# Patient Record
Sex: MALE | Race: WHITE | Employment: FULL TIME | URBAN - METROPOLITAN AREA
[De-identification: names, ages, dates, MRNs, and addresses within clinical notes are randomized per-mention and may not be internally consistent; named-entity substitution may affect disease eponyms.]

---

## 2017-02-16 ENCOUNTER — OFFICE VISIT (OUTPATIENT)
Dept: SURGERY | Age: 37
End: 2017-02-16

## 2017-02-16 VITALS
DIASTOLIC BLOOD PRESSURE: 100 MMHG | OXYGEN SATURATION: 98 % | HEART RATE: 64 BPM | RESPIRATION RATE: 18 BRPM | HEIGHT: 75 IN | SYSTOLIC BLOOD PRESSURE: 150 MMHG | BODY MASS INDEX: 31.71 KG/M2 | TEMPERATURE: 97.7 F | WEIGHT: 255 LBS

## 2017-02-16 DIAGNOSIS — Z46.51 ADMISSION FOR ADJUSTMENT OF GASTRIC LAP BAND: ICD-10-CM

## 2017-02-16 DIAGNOSIS — R12 HEARTBURN: ICD-10-CM

## 2017-02-16 DIAGNOSIS — E66.9 OBESITY (BMI 30.0-34.9): ICD-10-CM

## 2017-02-16 DIAGNOSIS — E66.01 MORBID OBESITY DUE TO EXCESS CALORIES (HCC): Primary | ICD-10-CM

## 2017-02-16 NOTE — MR AVS SNAPSHOT
Visit Information Date & Time Provider Department Dept. Phone Encounter #  
 2/16/2017  3:00 PM Agustín Jones NP [place] [id number] [phone] [id number] Upcoming Health Maintenance Date Due Pneumococcal 19-64 Medium Risk (1 of 1 - PPSV23) 9/10/2000 DTaP/Tdap/Td series (1 - Tdap) 9/10/2002 INFLUENZA AGE 9 TO ADULT 8/1/2016 Allergies as of 2/16/2017  Review Complete On: 2/16/2017 By: Bakari Harris Severity Noted Reaction Type Reactions Codeine  12/24/2010    Palpitations Current Immunizations  Reviewed on 12/12/2013 No immunizations on file. Not reviewed this visit Vitals BP Pulse Temp Resp Height(growth percentile) Weight(growth percentile) (!) 150/100 (BP 1 Location: Left arm, BP Patient Position: Sitting) 64 97.7 °F (36.5 °C) (Oral) 18 6' 3\" (1.905 m) 255 lb (115.7 kg) SpO2 BMI Smoking Status 98% 31.87 kg/m2 Never Smoker BMI and BSA Data Body Mass Index Body Surface Area  
 31.87 kg/m 2 2.47 m 2 Preferred Pharmacy Pharmacy Name Phone [facility] [address] [phone] Your Updated Medication List  
  
   
This list is accurate as of: 2/16/17  3:48 PM.  Always use your most recent med list.  
  
  
  
  
 BACTRIM  mg per tablet Generic drug:  trimethoprim-sulfamethoxazole Take 1 Tab by mouth two (2) times a day. ergocalciferol 50,000 unit capsule Commonly known as:  ERGOCALCIFEROL Take 1 capsule by mouth every seven (7) days. Omeprazole delayed release 20 mg tablet Commonly known as:  PRILOSEC D/R Take 20 mg by mouth daily. traMADol 50 mg tablet Commonly known as:  ULTRAM  
Take 1 tablet by mouth every six (6) hours as needed for Pain. Patient Instructions Taniya Bailon will call you about the UGI and will try and get done on Monday at [facility] Surgery at [facility] 
[phone] Post Lap Band Adjustment Instructions Your band is now more restrictive. Some swelling can occur in the band following a fill. Therefore, you should eat : 
 
Full liquids for 12-24 hours Soft & mushy foods for 2 days Resume regular food on the 4th day. ** All meals should fit in a 4 ounce cup. (1/2 cup container) ** Choose foods that require chewing, ideally foods rich in fiber & protein. Avoid fatty & sugary foods. Avoid snack food items. Eating tips: 
 
Put down your fork between bites. Do not talk and eat at the same time. No drinking 30 minutes before or one hour after a meal. 
 
** Call for an evaluation if you develop new reflux (heartburn), a night time cough or inability to tolerate solids foods. ** Your next regular follow-up appointment is in: 4 to 6 weeks. Please call the office at [phone] to schedule this appointment. If you have any problems before your scheduled appointment, don't wait. Call the office when you are having the problem. They may need to see you before your scheduled appointment. Introducing [facility]! Dear Bree Pat: Thank you for requesting a Gogo account. Our records indicate that you already have an active Gogo account. You can access your account anytime at [email or web address] Did you know that you can access your hospital and ER discharge instructions at any time in Gogo? You can also review all of your test results from your hospital stay or ER visit. Additional Information If you have questions, please visit the Frequently Asked Questions section of the Gogo website at [email or web address] Remember, Gogo is NOT to be used for urgent needs. For medical emergencies, dial 911. Now available from your iPhone and Android! Please provide this summary of care documentation to your next provider. Your primary care clinician is listed as Patricia Carl. If you have any questions after today's visit, please call [phone].

## 2017-02-16 NOTE — PATIENT INSTRUCTIONS
Sivan Jackson will call you about the UGI and will try and get done on Monday at [address] [place] [address] Surgery at [facility]  [phone]      Post Lap Band Adjustment Instructions    Your band is now more restrictive. Some swelling can occur in the band following a fill. Therefore, you should eat :    Full liquids for 12-24 hours  Soft & mushy foods for 2 days  Resume regular food on the 4th day. ** All meals should fit in a 4 ounce cup. (1/2 cup container) **  Choose foods that require chewing, ideally foods rich in fiber & protein. Avoid fatty & sugary foods. Avoid snack food items. Eating tips:    Put down your fork between bites. Do not talk and eat at the same time. No drinking 30 minutes before or one hour after a meal.    ** Call for an evaluation if you develop new reflux (heartburn), a night time cough or inability to tolerate solids foods. **    Your next regular follow-up appointment is in: 4 to 6 weeks. Please call the office at [phone] to schedule this appointment. If you have any problems before your scheduled appointment, don't wait. Call the office when you are having the problem. They may need to see you before your scheduled appointment.

## 2017-02-16 NOTE — PROGRESS NOTES
1. Have you been to the ER, urgent care clinic since your last visit? Hospitalized since your last visit? No    2. Have you seen or consulted any other health care providers outside of the [facility] since your last visit? Include any pap smears or colon screening.  No

## 2017-02-20 NOTE — PROGRESS NOTES
Subjective:   Radha Wheeler is a 29 y.o. male with previous lap band surgery, who is here today needing lap band adjustment because of decreased satiety (pt. able to eat > 4 oz at one meal) and early sense of hunger (within 1-2 hours after eating). Dietary compliance is good. He has occasional episodes if \"tightness\" with the band and will drop back to full liquid for a few days. Occasional reflux and indigestion, but usually associated with eating too late. He has no sense of restriction and \"just wants a little back\". Objective:     Visit Vitals    BP (!) 150/100 (BP 1 Location: Left arm, BP Patient Position: Sitting)    Pulse 64    Temp 97.7 °F (36.5 °C) (Oral)    Resp 18    Ht 6' 3\" (1.905 m)    Wt 255 lb (115.7 kg)    SpO2 98%    BMI 31.87 kg/m2      Estimated body mass index is 31.87 kg/(m^2) as calculated from the following:    Height as of this encounter: 6' 3\" (1.905 m). Weight as of this encounter: 255 lb (115.7 kg). Wt Readings from Last 3 Encounters:   02/16/17 255 lb (115.7 kg)   12/02/15 272 lb (123.4 kg)   11/19/15 272 lb (123.4 kg)     His change in weight since last visit: lost,  17 lbs. Visit Vitals    BP (!) 150/100 (BP 1 Location: Left arm, BP Patient Position: Sitting)    Pulse 64    Temp 97.7 °F (36.5 °C) (Oral)    Resp 18    Ht 6' 3\" (1.905 m)    Wt 255 lb (115.7 kg)    SpO2 98%    BMI 31.87 kg/m2     A + O x 3  Chest  CTA  COR  RRR  ABD Soft, port palpable and non tender, ND, +BS,  EXT No edema; ambulating independently        Assessment/Plan: Morbid Obesity, status post lap-band surgery, needing fill adjustment  Lap Band FiIl Procedure    Verbal consent was obtained. The patient was placed in the standing position. The port area was prepped using sterile technique and a Montelongo needle was inserted. The port was accessed with ease. Previous Fill Volume:  7.5 cc.    Added:  0.4 cc   Plan on UGI to evaluate band position / pouch size with phone follow up Reviewed Red Zone symptoms   Patient tolerated the procedure well. Follow up in 4 to 6 weeks as needed.

## 2017-10-13 ENCOUNTER — TELEPHONE (OUTPATIENT)
Dept: SURGERY | Age: 37
End: 2017-10-13

## 2017-10-20 ENCOUNTER — OFFICE VISIT (OUTPATIENT)
Dept: SURGERY | Age: 37
End: 2017-10-20

## 2017-10-20 VITALS
RESPIRATION RATE: 18 BRPM | OXYGEN SATURATION: 99 % | HEIGHT: 75 IN | TEMPERATURE: 98.2 F | BODY MASS INDEX: 34.69 KG/M2 | HEART RATE: 73 BPM | WEIGHT: 279 LBS | SYSTOLIC BLOOD PRESSURE: 130 MMHG | DIASTOLIC BLOOD PRESSURE: 80 MMHG

## 2017-10-20 DIAGNOSIS — E55.9 VITAMIN D DEFICIENCY: ICD-10-CM

## 2017-10-20 DIAGNOSIS — R63.5 WEIGHT GAIN: ICD-10-CM

## 2017-10-20 DIAGNOSIS — E66.01 SEVERE OBESITY (BMI 35.0-39.9): Primary | ICD-10-CM

## 2017-10-20 DIAGNOSIS — Z46.51 ADMISSION FOR ADJUSTMENT OF GASTRIC LAP BAND: ICD-10-CM

## 2017-10-20 RX ORDER — ERGOCALCIFEROL 1.25 MG/1
50000 CAPSULE ORAL
Qty: 5 CAP | Refills: 5 | Status: SHIPPED | OUTPATIENT
Start: 2017-10-20 | End: 2018-10-23

## 2017-10-20 NOTE — PROGRESS NOTES
1. Have you been to the ER, urgent care clinic since your last visit? Hospitalized since your last visit?  no    2. Have you seen or consulted any other health care providers outside of the [address] [place] [address] since your last visit? Include any pap smears or colon screening.    no

## 2017-10-20 NOTE — PATIENT INSTRUCTIONS
Isabel Yañez will contact you to conform date/time for band evaluation in xray     Tentatively plan on Wednesday 12/6/17 early afternoon     If you continue to have reflux, night-time cough, heartburn or regurgitation, please call me and we will get a look at your band sooner.     Schedule an appointment with the dietician, please call or email   Scooby Lanier RD at:    [phone]  [email or web address]

## 2017-10-20 NOTE — MR AVS SNAPSHOT
Visit Information Date & Time Provider Department Dept. Phone Encounter #  
 10/20/2017 10:00 AM Guero Peng NP [facility] GENERAL SURGERY [phone] [id number] Upcoming Health Maintenance Date Due Pneumococcal 19-64 Medium Risk (1 of 1 - PPSV23) 9/10/2000 DTaP/Tdap/Td series (1 - Tdap) 9/10/2002 INFLUENZA AGE 9 TO ADULT 8/1/2017 Allergies as of 10/20/2017  Review Complete On: 10/20/2017 By: Jessie Lopez LPN Severity Noted Reaction Type Reactions Codeine  12/24/2010    Palpitations Current Immunizations  Reviewed on 12/12/2013 No immunizations on file. Not reviewed this visit You Were Diagnosed With   
  
 Codes Comments Vitamin D deficiency    -  Primary ICD-10-CM: E55.9 ICD-9-CM: 268.9 Vitals BP Pulse Temp Resp Height(growth percentile) Weight(growth percentile) 130/80 73 98.2 °F (36.8 °C) 18 6' 3\" (1.905 m) 279 lb (126.6 kg) SpO2 BMI Smoking Status 99% 34.87 kg/m2 Never Smoker Vitals History BMI and BSA Data Body Mass Index Body Surface Area 34.87 kg/m 2 2.59 m 2 Preferred Pharmacy Pharmacy Name Phone Emily [facility] [address] [place] [address] [phone] Your Updated Medication List  
  
   
This list is accurate as of: 10/20/17 11:11 AM.  Always use your most recent med list.  
  
  
  
  
 ergocalciferol 50,000 unit capsule Commonly known as:  ERGOCALCIFEROL Take 1 Cap by mouth every seven (7) days. Prescriptions Sent to Pharmacy Refills  
 ergocalciferol (ERGOCALCIFEROL) 50,000 unit capsule 5 Sig: Take 1 Cap by mouth every seven (7) days. Class: Normal  
 Pharmacy: Emily [facility]ultz[facility], [address] [facility] #: [phone] Route: Oral  
  
We Performed the Following VITAMIN D, 25 HYDROXY [id number] CPT(R)] Patient Instructions Paul Melgar will contact you to conform date/time for band evaluation in xray Tentatively plan on Wednesday 12/6/17 early afternoon If you continue to have reflux, night-time cough, heartburn or regurgitation, please call me and we will get a look at your band sooner. Schedule an appointment with the dietician, please call or email Barry Rodriguez RD at: 
 
[phone] [email or web address] Introducing [facility]! Dear Libia Quick: Thank you for requesting a Gimado account. Our records indicate that you already have an active Gimado account. You can access your account anytime at [email or web address] Did you know that you can access your hospital and ER discharge instructions at any time in Gimado? You can also review all of your test results from your hospital stay or ER visit. Additional Information If you have questions, please visit the Frequently Asked Questions section of the Gimado website at [email or web address] Remember, Gimado is NOT to be used for urgent needs. For medical emergencies, dial 911. Now available from your iPhone and Android! Please provide this summary of care documentation to your next provider. Your primary care clinician is listed as Danie Mclaughlin. If you have any questions after today's visit, please call [phone].

## 2017-10-31 NOTE — PROGRESS NOTES
Subjective:   Irwin Weston is a 40 y.o. male with previous lap band surgery, who is here today needing lap band adjustment because of decreased satiety (pt. able to eat > 4 oz at one meal), weight gain, productive burping, reflux and difficulty swallowing. Dietary compliance is poor. He has been struggling for some time and had no insurance coverage. He has switch jobs and now has benefits. He reports frequent regurgitation and reflux. PRN night cough if eats too late. No port pain. Objective:     Visit Vitals    /80    Pulse 73    Temp 98.2 °F (36.8 °C)    Resp 18    Ht 6' 3\" (1.905 m)    Wt 279 lb (126.6 kg)    SpO2 99%    BMI 34.87 kg/m2      Estimated body mass index is 34.87 kg/(m^2) as calculated from the following:    Height as of this encounter: 6' 3\" (1.905 m). Weight as of this encounter: 279 lb (126.6 kg). Wt Readings from Last 3 Encounters:   10/20/17 279 lb (126.6 kg)   02/16/17 255 lb (115.7 kg)   12/02/15 272 lb (123.4 kg)     His change in weight since last visit: gained, 20+ lbs. A + O x 3  Chest  CTA  COR  RRR  ABD Soft, obese, port palpable and non tender/ND, +BS  EXT No edema; ambulating independently    Visit Vitals    /80    Pulse 73    Temp 98.2 °F (36.8 °C)    Resp 18    Ht 6' 3\" (1.905 m)    Wt 279 lb (126.6 kg)    SpO2 99%    BMI 34.87 kg/m2         Assessment/Plan: Morbid Obesity, status post lap-band surgery, needing fill adjustment  Lap Band FiIl Procedure    Verbal consent was obtained. The patient was placed in the standing position. The port area was prepped using sterile technique and a Montelongo needle was inserted. The port was accessed with ease. Previous Fill Volume:  7.9 cc. Removed:  1 cc   Patient tolerated the procedure well.   Will plan on fluoroscopic evaluation in next 2 months   Reviewed red zone symptoms   Referred to RD for back on track   Plan on office follow up after fluoro evaluation   Irwin Weston verbalized understanding and questions were answered to the best of my knowledge and ability. Diet  educational materials were provided.

## 2018-01-03 ENCOUNTER — APPOINTMENT (OUTPATIENT)
Dept: GENERAL RADIOLOGY | Age: 38
End: 2018-01-03
Attending: SURGERY
Payer: SELF-PAY

## 2018-01-03 ENCOUNTER — HOSPITAL ENCOUNTER (OUTPATIENT)
Age: 38
Setting detail: OUTPATIENT SURGERY
Discharge: HOME | End: 2018-01-03
Attending: SURGERY | Admitting: SURGERY
Payer: SELF-PAY

## 2018-01-03 VITALS
DIASTOLIC BLOOD PRESSURE: 91 MMHG | BODY MASS INDEX: 36.45 KG/M2 | HEART RATE: 70 BPM | WEIGHT: 293.12 LBS | HEIGHT: 75 IN | SYSTOLIC BLOOD PRESSURE: 144 MMHG

## 2018-01-03 PROCEDURE — 77002 NEEDLE LOCALIZATION BY XRAY: CPT

## 2018-01-03 PROCEDURE — 43999 UNLISTED PROCEDURE STOMACH: CPT | Performed by: SURGERY

## 2018-01-03 PROCEDURE — 77030003562 HC NDL HUBR J&J -A: Performed by: SURGERY

## 2018-01-03 NOTE — PROGRESS NOTES
PROCEDURAL PROGRESS NOTES FOR LAP BAND FILLS      Patient ID:   Name: Prashant Severino   Medical Record Number: [id number]   YOB: 1980         Date of Procedure: 1/3/2018    Pre-Procedure Diagnosis: MORBID OBESITY    Post-Procedure Diagnosis:  MORBID OBESITY              Height: 6' 3\" (190.5 cm)      Weight: 293 lb 1.9 oz (133 kg)      Type of Band: AP std      Reason for Band Visit: Decreased satiety (Pt. able to eat more than 4 oz at a meal)    Surgeon(s) and Role:     * Hargis Bumpers, MD - supervising Physician  Amy Sterling NP- performing fill. Procedure: Procedure(s):  GASTRIC BAND FILL    Findings:   Band in what position:  Good             Saline (mL) added to Band: 0.7      Total saline (mL) placed in Band: 7.5. Estimated Blood Loss: 0 ml    Complications: None       Lap band AP standard 12/2013  Lost to follow due to insurance issues. Wants to get back on track. Last seen in the office 10/31/2017 and fluid was removed due to reflux. Informed consent was obtained. Patient was placed in the standing position. The abdomen was prepped using sterile technique. The port was then accessed with ease. 6.9 ml was documented in  the band. 0.7 ml was added to band. Total of 7.6 in band. Patient tolerated the procedure well and without difficulty. Patient then tolerated at least 6 ounces of water without difficulty. Patient was instructed in full liquid diet for next 48 hours and then advance as tolerated. Patient to follow up in 4-6 weeks. Patient was advised to notify office if experience dysphagia, nausea/vomiting, reflux.     Signed By: Chantal Colvin NP     January 3, 2018 1:44 PM

## 2018-01-03 NOTE — DISCHARGE INSTRUCTIONS
General Surgery at [facility]    Post Lap Band Fill Instructions    Your band is now very tight. Some swelling can occur at the band following a fill. Therefore, you should eat:    Full liquids for 2 days (today & tomorrow)  Soft & mushy foods for 2 days (Friday & Saturday)  Resume regular food on the 5th day following your fill. - Sunday  **All meals should fit in a 4 ounce cup.  (1/2 cup container)**    Eating tips:  Put down your fork between bites. Do not talk and eat at the same time. If you must use your fork, use it to push the food around on your plate while chewing what is in your mouth. No drinking 30 minutes prior or one hour following a meal.    Weigh yourself every Monday morning. Call for a fill if you are losing less than 1.5 pounds a week. Call for a fill if you are having increasing hunger between meals. Call for an evaluation if you develop new reflux or inability to tolerate solid foods. Your next regular follow-up appointment is in:  4 to 6 weeks  Please call the office at [phone] to schedule this appointment. If you have any problems before your scheduled appointment, don't wait. Call the office when you are having the problem. They may need to see your before your scheduled appointment.

## 2018-10-15 ENCOUNTER — TELEPHONE (OUTPATIENT)
Dept: SURGERY | Age: 38
End: 2018-10-15

## 2018-10-22 ENCOUNTER — HOSPITAL ENCOUNTER (EMERGENCY)
Age: 38
Discharge: HOME | End: 2018-10-23
Attending: EMERGENCY MEDICINE
Payer: COMMERCIAL

## 2018-10-22 DIAGNOSIS — N20.0 KIDNEY STONE: Primary | ICD-10-CM

## 2018-10-22 PROCEDURE — 99284 EMERGENCY DEPT VISIT MOD MDM: CPT

## 2018-10-23 ENCOUNTER — APPOINTMENT (OUTPATIENT)
Dept: CT IMAGING | Age: 38
End: 2018-10-23
Attending: PHYSICIAN ASSISTANT
Payer: COMMERCIAL

## 2018-10-23 VITALS
OXYGEN SATURATION: 97 % | HEART RATE: 73 BPM | DIASTOLIC BLOOD PRESSURE: 95 MMHG | TEMPERATURE: 98.3 F | WEIGHT: 290 LBS | HEIGHT: 76 IN | BODY MASS INDEX: 35.31 KG/M2 | RESPIRATION RATE: 16 BRPM | SYSTOLIC BLOOD PRESSURE: 140 MMHG

## 2018-10-23 LAB
ALBUMIN SERPL-MCNC: 3.8 G/DL (ref 3.5–5)
ALBUMIN/GLOB SERPL: 1 {RATIO} (ref 1.1–2.2)
ALP SERPL-CCNC: 77 U/L (ref 45–117)
ALT SERPL-CCNC: 25 U/L (ref 12–78)
ANION GAP SERPL CALC-SCNC: 9 MMOL/L (ref 5–15)
APPEARANCE UR: CLEAR
AST SERPL-CCNC: 25 U/L (ref 15–37)
BACTERIA URNS QL MICRO: NEGATIVE /HPF
BASOPHILS # BLD: 0.1 K/UL (ref 0–0.1)
BASOPHILS NFR BLD: 1 % (ref 0–1)
BILIRUB SERPL-MCNC: 0.8 MG/DL (ref 0.2–1)
BILIRUB UR QL: NEGATIVE
BUN SERPL-MCNC: 15 MG/DL (ref 6–20)
BUN/CREAT SERPL: 12 (ref 12–20)
CALCIUM SERPL-MCNC: 8.7 MG/DL (ref 8.5–10.1)
CHLORIDE SERPL-SCNC: 106 MMOL/L (ref 97–108)
CO2 SERPL-SCNC: 28 MMOL/L (ref 21–32)
COLOR UR: ABNORMAL
COMMENT, HOLDF: NORMAL
CREAT SERPL-MCNC: 1.24 MG/DL (ref 0.7–1.3)
DIFFERENTIAL METHOD BLD: NORMAL
EOSINOPHIL # BLD: 0.4 K/UL (ref 0–0.4)
EOSINOPHIL NFR BLD: 4 % (ref 0–7)
EPITH CASTS URNS QL MICRO: ABNORMAL /LPF
ERYTHROCYTE [DISTWIDTH] IN BLOOD BY AUTOMATED COUNT: 13.2 % (ref 11.5–14.5)
GLOBULIN SER CALC-MCNC: 3.8 G/DL (ref 2–4)
GLUCOSE SERPL-MCNC: 100 MG/DL (ref 65–100)
GLUCOSE UR STRIP.AUTO-MCNC: NEGATIVE MG/DL
HCT VFR BLD AUTO: 43.5 % (ref 36.6–50.3)
HGB BLD-MCNC: 14.7 G/DL (ref 12.1–17)
HGB UR QL STRIP: ABNORMAL
IMM GRANULOCYTES # BLD: 0 K/UL (ref 0–0.04)
IMM GRANULOCYTES NFR BLD AUTO: 0 % (ref 0–0.5)
KETONES UR QL STRIP.AUTO: 15 MG/DL
LEUKOCYTE ESTERASE UR QL STRIP.AUTO: NEGATIVE
LIPASE SERPL-CCNC: 132 U/L (ref 73–393)
LYMPHOCYTES # BLD: 2.1 K/UL (ref 0.8–3.5)
LYMPHOCYTES NFR BLD: 20 % (ref 12–49)
MCH RBC QN AUTO: 30.1 PG (ref 26–34)
MCHC RBC AUTO-ENTMCNC: 33.8 G/DL (ref 30–36.5)
MCV RBC AUTO: 89.1 FL (ref 80–99)
MONOCYTES # BLD: 0.6 K/UL (ref 0–1)
MONOCYTES NFR BLD: 6 % (ref 5–13)
NEUTS SEG # BLD: 7.3 K/UL (ref 1.8–8)
NEUTS SEG NFR BLD: 69 % (ref 32–75)
NITRITE UR QL STRIP.AUTO: NEGATIVE
NRBC # BLD: 0 K/UL (ref 0–0.01)
NRBC BLD-RTO: 0 PER 100 WBC
PH UR STRIP: 5.5 [PH] (ref 5–8)
PLATELET # BLD AUTO: 267 K/UL (ref 150–400)
PMV BLD AUTO: 9.9 FL (ref 8.9–12.9)
POTASSIUM SERPL-SCNC: 3.8 MMOL/L (ref 3.5–5.1)
PROT SERPL-MCNC: 7.6 G/DL (ref 6.4–8.2)
PROT UR STRIP-MCNC: NEGATIVE MG/DL
RBC # BLD AUTO: 4.88 M/UL (ref 4.1–5.7)
RBC #/AREA URNS HPF: ABNORMAL /HPF (ref 0–5)
SAMPLES BEING HELD,HOLD: NORMAL
SODIUM SERPL-SCNC: 143 MMOL/L (ref 136–145)
SP GR UR REFRACTOMETRY: >1.03 (ref 1–1.03)
UR CULT HOLD, URHOLD: NORMAL
UROBILINOGEN UR QL STRIP.AUTO: 0.2 EU/DL (ref 0.2–1)
WBC # BLD AUTO: 10.5 K/UL (ref 4.1–11.1)
WBC URNS QL MICRO: ABNORMAL /HPF (ref 0–4)

## 2018-10-23 PROCEDURE — 83690 ASSAY OF LIPASE: CPT | Performed by: PHYSICIAN ASSISTANT

## 2018-10-23 PROCEDURE — 85025 COMPLETE CBC W/AUTO DIFF WBC: CPT | Performed by: PHYSICIAN ASSISTANT

## 2018-10-23 PROCEDURE — 96361 HYDRATE IV INFUSION ADD-ON: CPT

## 2018-10-23 PROCEDURE — 74177 CT ABD & PELVIS W/CONTRAST: CPT

## 2018-10-23 PROCEDURE — 96374 THER/PROPH/DIAG INJ IV PUSH: CPT

## 2018-10-23 PROCEDURE — 36415 COLL VENOUS BLD VENIPUNCTURE: CPT | Performed by: PHYSICIAN ASSISTANT

## 2018-10-23 PROCEDURE — 81001 URINALYSIS AUTO W/SCOPE: CPT | Performed by: PHYSICIAN ASSISTANT

## 2018-10-23 PROCEDURE — 74011250636 HC RX REV CODE- 250/636: Performed by: PHYSICIAN ASSISTANT

## 2018-10-23 PROCEDURE — 80053 COMPREHEN METABOLIC PANEL: CPT | Performed by: PHYSICIAN ASSISTANT

## 2018-10-23 PROCEDURE — 96375 TX/PRO/DX INJ NEW DRUG ADDON: CPT

## 2018-10-23 PROCEDURE — 74011636320 HC RX REV CODE- 636/320: Performed by: RADIOLOGY

## 2018-10-23 RX ORDER — KETOROLAC TROMETHAMINE 30 MG/ML
30 INJECTION, SOLUTION INTRAMUSCULAR; INTRAVENOUS
Status: COMPLETED | OUTPATIENT
Start: 2018-10-23 | End: 2018-10-23

## 2018-10-23 RX ORDER — ONDANSETRON 2 MG/ML
4 INJECTION INTRAMUSCULAR; INTRAVENOUS
Status: DISCONTINUED | OUTPATIENT
Start: 2018-10-23 | End: 2018-10-23 | Stop reason: HOSPADM

## 2018-10-23 RX ORDER — ONDANSETRON 4 MG/1
4 TABLET, ORALLY DISINTEGRATING ORAL
Qty: 12 TAB | Refills: 0 | Status: SHIPPED | OUTPATIENT
Start: 2018-10-23 | End: 2019-06-11

## 2018-10-23 RX ORDER — TAMSULOSIN HYDROCHLORIDE 0.4 MG/1
0.4 CAPSULE ORAL DAILY
Qty: 15 CAP | Refills: 0 | Status: SHIPPED | OUTPATIENT
Start: 2018-10-23 | End: 2018-11-07

## 2018-10-23 RX ORDER — OXYCODONE AND ACETAMINOPHEN 5; 325 MG/1; MG/1
1 TABLET ORAL
Qty: 10 TAB | Refills: 0 | Status: SHIPPED | OUTPATIENT
Start: 2018-10-23 | End: 2019-06-11

## 2018-10-23 RX ORDER — MORPHINE SULFATE 4 MG/ML
4 INJECTION INTRAVENOUS
Status: COMPLETED | OUTPATIENT
Start: 2018-10-23 | End: 2018-10-23

## 2018-10-23 RX ADMIN — MORPHINE SULFATE 4 MG: 4 INJECTION, SOLUTION INTRAMUSCULAR; INTRAVENOUS at 01:51

## 2018-10-23 RX ADMIN — IOPAMIDOL 100 ML: 755 INJECTION, SOLUTION INTRAVENOUS at 01:45

## 2018-10-23 RX ADMIN — SODIUM CHLORIDE 1000 ML: 900 INJECTION, SOLUTION INTRAVENOUS at 00:35

## 2018-10-23 RX ADMIN — KETOROLAC TROMETHAMINE 30 MG: 30 INJECTION, SOLUTION INTRAMUSCULAR at 00:35

## 2018-10-23 NOTE — ED PROVIDER NOTES
Virginia Chavez is a 41 y.o. male  who presents by private vehicle to ER with c/o Patient presents with: 
Abdominal Pain Patient reports with abdominal pain x 3 hours. Patient reports last bowel movement with 2 days ago. Patient reports history of kidney stones and lap band surgery. Patient reports nausea, denies vomiting or fever. Patient denies pain with urination. Patient reports he was on the toilet and \"blacked out from the pain\" 1 hour prior to arrival.  
 
He specifically denies any fevers, chills, vomiting, chest pain, shortness of breath, headache, rash, diarrhea,  urinary/bowel changes, sweating or weight loss. PCP: Clarissa Mari MD  
PMHx significant for: Past Medical History: 
No date: Allergic rhinitis No date: Hypertension No date: Kidney stone No date: Migraine No date: Morbid obesity ([facility] [address] 2008: Personal history of kidney stones No date: Sleep apnea Comment:  BiPap PSHx significant for: Past Surgical History: 
12/12/13: ABDOMEN SURGERY PROC UNLISTED Comment:  Laparoscopic adjustable gastric banding by Dr Jonelle Schwartz Social Hx: Tobacco use: Social History Tobacco Use Smoking status: Never Smoker Smokeless tobacco: Never Used 
; EtOH use: The patient states he drinks 0 per week.; Illicit Drug use: Allergies: 
 -- Codeine -- Palpitations There are no other complaints, changes or physical findings at this time. The history is provided by the patient. Abdominal Pain This is a new problem. The problem occurs constantly. The problem has not changed since onset. The pain is located in the generalized abdominal region. The pain is at a severity of 9/10. The pain is severe. Associated symptoms include nausea. Nothing worsens the pain. The pain is relieved by nothing. His past medical history is significant for kidney stones.  His past medical history does not include Crohn's disease, UTI, pancreatitis or diverticulitis. The patient's surgical history includes gastric bypass. Past Medical History:  
Diagnosis Date  Allergic rhinitis  Hypertension  Kidney stone  Migraine  Morbid obesity ([address]  Personal history of kidney stones 2008  Sleep apnea BiPap Past Surgical History:  
Procedure Laterality Date  ABDOMEN SURGERY PROC UNLISTED  12/12/13 Laparoscopic adjustable gastric banding by Dr Ruddy Guerrier Family History:  
Problem Relation Age of Onset  Elevated Lipids Mother  Hypertension Mother  Diabetes Mother  Cancer Mother  Heart Disease Mother  Heart Disease Father  Diabetes Father  Heart Attack Father  Hypertension Father  Cancer Father  Diabetes Sister  Hypertension Sister  Elevated Lipids Sister Social History Socioeconomic History  Marital status: SINGLE Spouse name: Not on file  Number of children: Not on file  Years of education: Not on file  Highest education level: Not on file Social Needs  Financial resource strain: Not on file  Food insecurity - worry: Not on file  Food insecurity - inability: Not on file  Transportation needs - medical: Not on file  Transportation needs - non-medical: Not on file Occupational History  Not on file Tobacco Use  Smoking status: Never Smoker  Smokeless tobacco: Never Used Substance and Sexual Activity  Alcohol use: Yes Alcohol/week: 1.0 oz Types: 2 Shots of liquor per week Comment: 2 SHOTS A WEEK MAX.  Drug use: No  
 Sexual activity: Yes Other Topics Concern  Not on file Social History Narrative  Not on file ALLERGIES: Codeine Review of Systems Constitutional: Negative. HENT: Negative. Eyes: Negative. Respiratory: Negative. Cardiovascular: Negative. Gastrointestinal: Positive for abdominal pain and nausea. Endocrine: Negative. Genitourinary: Negative. Musculoskeletal: Negative. Skin: Negative. Allergic/Immunologic: Negative. Neurological: Negative. Hematological: Negative. All other systems reviewed and are negative. Vitals:  
 10/23/18 0015 10/23/18 0030 10/23/18 0045 10/23/18 0100 BP: (!) 158/101 (!) 178/120 (!) 158/116 (!) 162/99 Pulse:      
Resp:      
Temp:      
SpO2: 98% 100% 96% 100% Weight:      
Height:      
      
 
Physical Exam  
Constitutional: He is oriented to person, place, and time. He appears well-developed and well-nourished. HENT:  
Head: Normocephalic and atraumatic. Right Ear: External ear normal.  
Left Ear: External ear normal.  
Mouth/Throat: Oropharynx is clear and moist. No oropharyngeal exudate. Eyes: Conjunctivae and EOM are normal. Pupils are equal, round, and reactive to light. Right eye exhibits no discharge. Left eye exhibits no discharge. No scleral icterus. Neck: Normal range of motion. Neck supple. No tracheal deviation present. No thyromegaly present. Cardiovascular: Normal rate, regular rhythm, normal heart sounds and intact distal pulses. No murmur heard. Pulmonary/Chest: Effort normal and breath sounds normal. No respiratory distress. He has no wheezes. He has no rales. Abdominal: Soft. Bowel sounds are normal. He exhibits no distension. There is generalized tenderness. There is no rebound and no guarding. Musculoskeletal: Normal range of motion. He exhibits no edema or tenderness. Lymphadenopathy:  
  He has no cervical adenopathy. Neurological: He is alert and oriented to person, place, and time. No cranial nerve deficit. Coordination normal.  
Skin: Skin is warm. No rash noted. No erythema. Psychiatric: He has a normal mood and affect. His behavior is normal. Judgment and thought content normal.  
Nursing note and vitals reviewed. MDM Number of Diagnoses or Management Options Kidney stone: Diagnosis management comments: Assesment/Plan- 41 y.o. Patient presents with: 
Abdominal Pain 
differential includes: colitis, kidney stone, obstruction, gastroenteritis . Labs and imaging reviewed with kidney stone with hydronephrosis. Will discharge home. Recommend Urology follow up. Patient educated on reasons to return to the ED. Amount and/or Complexity of Data Reviewed Clinical lab tests: ordered and reviewed Tests in the radiology section of CPT®: ordered Tests in the medicine section of CPT®: ordered and reviewed Procedures

## 2018-10-23 NOTE — DISCHARGE INSTRUCTIONS
Kidney Stone: Care Instructions  Your Care Instructions    Kidney stones are formed when salts, minerals, and other substances normally found in the urine clump together. They can be as small as grains of sand or, rarely, as large as golf balls. While the stone is traveling through the ureter, which is the tube that carries urine from the kidney to the bladder, you will probably feel pain. The pain may be mild or very severe. You may also have some blood in your urine. As soon as the stone reaches the bladder, any intense pain should go away. If a stone is too large to pass on its own, you may need a medical procedure to help you pass the stone. The doctor has checked you carefully, but problems can develop later. If you notice any problems or new symptoms, get medical treatment right away. Follow-up care is a key part of your treatment and safety. Be sure to make and go to all appointments, and call your doctor if you are having problems. It's also a good idea to know your test results and keep a list of the medicines you take. How can you care for yourself at home? · Drink plenty of fluids, enough so that your urine is light yellow or clear like water. If you have kidney, heart, or liver disease and have to limit fluids, talk with your doctor before you increase the amount of fluids you drink. · Take pain medicines exactly as directed. Call your doctor if you think you are having a problem with your medicine. ? If the doctor gave you a prescription medicine for pain, take it as prescribed. ? If you are not taking a prescription pain medicine, ask your doctor if you can take an over-the-counter medicine. Read and follow all instructions on the label. · Your doctor may ask you to strain your urine so that you can collect your kidney stone when it passes. You can use a kitchen strainer or a tea strainer to catch the stone. Store it in a plastic bag until you see your doctor again.   Preventing future kidney stones  Some changes in your diet may help prevent kidney stones. Depending on the cause of your stones, your doctor may recommend that you:  · Drink plenty of fluids, enough so that your urine is light yellow or clear like water. If you have kidney, heart, or liver disease and have to limit fluids, talk with your doctor before you increase the amount of fluids you drink. · Limit coffee, tea, and alcohol. Also avoid grapefruit juice. · Do not take more than the recommended daily dose of vitamins C and D.  · Avoid antacids such as Gaviscon, Maalox, Mylanta, or Tums. · Limit the amount of salt (sodium) in your diet. · Eat a balanced diet that is not too high in protein. · Limit foods that are high in a substance called oxalate, which can cause kidney stones. These foods include dark green vegetables, rhubarb, chocolate, wheat bran, nuts, cranberries, and beans. When should you call for help? Call your doctor now or seek immediate medical care if:    · You cannot keep down fluids.     · Your pain gets worse.     · You have a fever or chills.     · You have new or worse pain in your back just below your rib cage (the flank area).     · You have new or more blood in your urine.    Watch closely for changes in your health, and be sure to contact your doctor if:    · You do not get better as expected. Where can you learn more? Go to http://iona-terri.info/. Enter N249 in the search box to learn more about \"Kidney Stone: Care Instructions. \"  Current as of: March 15, 2018  Content Version: 11.8  © 4187-6334 [employer]. Care instructions adapted under license by [employer] (which disclaims liability or warranty for this information). If you have questions about a medical condition or this instruction, always ask your healthcare professional. [address] any warranty or liability for your use of this information.            We hope that we have addressed all of your medical concerns. The examination and treatment you received in the Emergency Department were for an emergent problem and were not intended as complete care. It is important that you follow up with your healthcare provider(s) for ongoing care. If your symptoms worsen or do not improve as expected, and you are unable to reach your usual health care provider(s), you should return to the Emergency Department. Today's healthcare is undergoing tremendous change, and patient satisfaction surveys are one of the many tools to assess the quality of medical care. You may receive a survey from the [employer] regarding your experience in the Emergency Department. I hope that your experience has been completely positive, particularly the medical care that I provided. As such, please participate in the survey; anything less than excellent does not meet my expectations or intentions. [facility] and 8 [facility] participate in nationally recognized quality of care measures. If your blood pressure is greater than 120/80, as reported below, we urge that you seek medical care to address the potential of high blood pressure, commonly known as hypertension. Hypertension can be hereditary or can be caused by certain medical conditions, pain, stress, or \"white coat syndrome. \"       Please make an appointment with your health care provider(s) for follow up of your Emergency Department visit. VITALS:   Patient Vitals for the past 8 hrs:   Temp Pulse Resp BP SpO2   10/23/18 0100 -- -- -- (!) 162/99 100 %   10/23/18 0045 -- -- -- (!) 158/116 96 %   10/23/18 0030 -- -- -- (!) 178/120 100 %   10/23/18 0015 -- -- -- (!) 158/101 98 %   10/22/18 2353 98.3 °F (36.8 °C) 73 16 (!) 185/119 99 %          Thank you for allowing us to provide you with medical care today. We realize that you have many choices for your emergency care needs.   Please choose us in the future for any continued health care needs. Prema Iban Carrillo, 16 [facility]3].   Office: [phone]            Recent Results (from the past 24 hour(s))   CBC WITH AUTOMATED DIFF    Collection Time: 10/23/18 12:39 AM   Result Value Ref Range    WBC 10.5 4.1 - 11.1 K/uL    RBC 4.88 4.10 - 5.70 M/uL    HGB 14.7 12.1 - 17.0 g/dL    HCT 43.5 36.6 - 50.3 %    MCV 89.1 80.0 - 99.0 FL    MCH 30.1 26.0 - 34.0 PG    MCHC 33.8 30.0 - 36.5 g/dL    RDW 13.2 11.5 - 14.5 %    PLATELET 423 178 - 984 K/uL    MPV 9.9 8.9 - 12.9 FL    NRBC 0.0 0  WBC    ABSOLUTE NRBC 0.00 0.00 - 0.01 K/uL    NEUTROPHILS 69 32 - 75 %    LYMPHOCYTES 20 12 - 49 %    MONOCYTES 6 5 - 13 %    EOSINOPHILS 4 0 - 7 %    BASOPHILS 1 0 - 1 %    IMMATURE GRANULOCYTES 0 0.0 - 0.5 %    ABS. NEUTROPHILS 7.3 1.8 - 8.0 K/UL    ABS. LYMPHOCYTES 2.1 0.8 - 3.5 K/UL    ABS. MONOCYTES 0.6 0.0 - 1.0 K/UL    ABS. EOSINOPHILS 0.4 0.0 - 0.4 K/UL    ABS. BASOPHILS 0.1 0.0 - 0.1 K/UL    ABS. IMM. GRANS. 0.0 0.00 - 0.04 K/UL    DF AUTOMATED     METABOLIC PANEL, COMPREHENSIVE    Collection Time: 10/23/18 12:39 AM   Result Value Ref Range    Sodium 143 136 - 145 mmol/L    Potassium 3.8 3.5 - 5.1 mmol/L    Chloride 106 97 - 108 mmol/L    CO2 28 21 - 32 mmol/L    Anion gap 9 5 - 15 mmol/L    Glucose 100 65 - 100 mg/dL    BUN 15 6 - 20 MG/DL    Creatinine 1.24 0.70 - 1.30 MG/DL    BUN/Creatinine ratio 12 12 - 20      GFR est AA >60 >60 ml/min/1.73m2    GFR est non-AA >60 >60 ml/min/1.73m2    Calcium 8.7 8.5 - 10.1 MG/DL    Bilirubin, total 0.8 0.2 - 1.0 MG/DL    ALT (SGPT) 25 12 - 78 U/L    AST (SGOT) 25 15 - 37 U/L    Alk.  phosphatase 77 45 - 117 U/L    Protein, total 7.6 6.4 - 8.2 g/dL    Albumin 3.8 3.5 - 5.0 g/dL    Globulin 3.8 2.0 - 4.0 g/dL    A-G Ratio 1.0 (L) 1.1 - 2.2     LIPASE    Collection Time: 10/23/18 12:39 AM   Result Value Ref Range    Lipase 132 73 - 393 U/L   SAMPLES BEING HELD    Collection Time: 10/23/18 12:39 AM   Result Value Ref Range    SAMPLES BEING HELD SST,RD,JUANA     COMMENT        Add-on orders for these samples will be processed based on acceptable specimen integrity and analyte stability, which may vary by analyte. No results found.

## 2018-10-23 NOTE — ED TRIAGE NOTES
Pt arrives with c/o abdominal pain X 3 hours. Pt worried he may be constipated, so he took a suppository of stool softener. Last bowel movement 2 days ago. Hx of kidney stones, but states this is a different pain. Pt states he \"blacked out from the pain\" about 1 hour ago.

## 2019-06-11 ENCOUNTER — OFFICE VISIT (OUTPATIENT)
Dept: SURGERY | Age: 39
End: 2019-06-11

## 2019-06-11 VITALS
RESPIRATION RATE: 20 BRPM | WEIGHT: 309.8 LBS | TEMPERATURE: 98.8 F | BODY MASS INDEX: 37.72 KG/M2 | DIASTOLIC BLOOD PRESSURE: 97 MMHG | HEIGHT: 76 IN | SYSTOLIC BLOOD PRESSURE: 148 MMHG | OXYGEN SATURATION: 97 % | HEART RATE: 75 BPM

## 2019-06-11 DIAGNOSIS — Z46.51 ADMISSION FOR ADJUSTMENT OF GASTRIC LAP BAND: Primary | ICD-10-CM

## 2019-06-11 DIAGNOSIS — R63.5 WEIGHT GAIN: ICD-10-CM

## 2019-06-11 DIAGNOSIS — K21.9 GASTROESOPHAGEAL REFLUX DISEASE, ESOPHAGITIS PRESENCE NOT SPECIFIED: ICD-10-CM

## 2019-06-11 NOTE — PROGRESS NOTES
Subjective:   Lyudmila Frazier is a 41 y.o. male with previous lap band surgery, who is here today needing lap band adjustment because of weight gain, productive burping and reflux. Says he is \"off the chain again with eating\". Having worsening GERD that is interfering with sleep and diet. No cough or upper respiratory issues. He has not been in follow up for 2 years. Dietary compliance is poor. No port pain   His weight had gotten up to 330 lbs and he needed to \"re group\"  Objective:     Visit Vitals  BP (!) 148/97 (BP 1 Location: Left arm, BP Patient Position: Sitting)   Pulse 75   Temp 98.8 °F (37.1 °C) (Oral)   Resp 20   Ht 6' 4\" (1.93 m)   Wt 309 lb 12.8 oz (140.5 kg)   SpO2 97%   BMI 37.71 kg/m²      Estimated body mass index is 37.71 kg/m² as calculated from the following:    Height as of this encounter: 6' 4\" (1.93 m). Weight as of this encounter: 309 lb 12.8 oz (140.5 kg). Wt Readings from Last 3 Encounters:   06/11/19 309 lb 12.8 oz (140.5 kg)   10/22/18 290 lb (131.5 kg)   01/03/18 293 lb 1.9 oz (133 kg)     His change in weight since last visit: gained, 19 lbs. A + O x 3  Chest  CTA  COR  RRR  ABD Soft, obese and port palpable RUQ; NT/ND, +BS  EXT No edema; ambulating independently      Assessment/Plan: Morbid Obesity, status post lap-band surgery, needing  adjustment  Lap Band adjustment  Procedure    Verbal consent was obtained. The patient was placed in the supine position. The port area was prepped using sterile technique and a Montelongo needle was inserted. The port was accessed with ease (port is deep and ant tilted). Previous Fill Volume:  7.6 cc. Removed:  2 cc  Allowed system to equilibrate  Referred to RD for BOT   Ideally he would like revision to RNY but does not think he has bariatric coverage   Continue PPI for now and update me in 10-14 days   If continued GERD symptoms will get UGI and further evaluate   Patient tolerated the procedure well.   Follow up with me in about 8 CINDY gonzalez in the interim   Mani Hawthorne verbalized understanding and questions were answered to the best of my knowledge and ability. Behavior change  educational materials were provided. Red zone symptoms reviewed   17 minutes spent in face to face with Mani Hawthorne > 50% counseling.

## 2019-06-11 NOTE — PATIENT INSTRUCTIONS
Schedule an appointment with the dietician, please call or email   Jose Neal RD at:   [email or web address] on follow up with me in about 8 weeks (Arline Gonzalez in the interim)     If the reflux persists, please let me know and will proceed with an upper GI     Ok to stay on the Omeprazole every day for now     Try the Orgain protein shake     Get back to moving every day

## 2019-07-23 ENCOUNTER — APPOINTMENT (OUTPATIENT)
Dept: CT IMAGING | Age: 39
End: 2019-07-23
Attending: EMERGENCY MEDICINE
Payer: COMMERCIAL

## 2019-07-23 ENCOUNTER — HOSPITAL ENCOUNTER (EMERGENCY)
Age: 39
Discharge: HOME | End: 2019-07-23
Attending: EMERGENCY MEDICINE | Admitting: EMERGENCY MEDICINE
Payer: COMMERCIAL

## 2019-07-23 VITALS
HEIGHT: 76 IN | HEART RATE: 72 BPM | WEIGHT: 309 LBS | DIASTOLIC BLOOD PRESSURE: 80 MMHG | RESPIRATION RATE: 16 BRPM | TEMPERATURE: 98 F | OXYGEN SATURATION: 100 % | BODY MASS INDEX: 37.63 KG/M2 | SYSTOLIC BLOOD PRESSURE: 143 MMHG

## 2019-07-23 DIAGNOSIS — R09.1 PLEURISY: Primary | ICD-10-CM

## 2019-07-23 DIAGNOSIS — R07.9 LEFT-SIDED CHEST PAIN: ICD-10-CM

## 2019-07-23 LAB
ALBUMIN SERPL-MCNC: 3.6 G/DL (ref 3.5–5)
ALBUMIN/GLOB SERPL: 1 {RATIO} (ref 1.1–2.2)
ALP SERPL-CCNC: 67 U/L (ref 45–117)
ALT SERPL-CCNC: 40 U/L (ref 12–78)
ANION GAP SERPL CALC-SCNC: 4 MMOL/L (ref 5–15)
AST SERPL-CCNC: 27 U/L (ref 15–37)
ATRIAL RATE: 89 BPM
BASOPHILS # BLD: 0.1 K/UL (ref 0–0.1)
BASOPHILS NFR BLD: 1 % (ref 0–1)
BILIRUB SERPL-MCNC: 0.6 MG/DL (ref 0.2–1)
BUN SERPL-MCNC: 9 MG/DL (ref 6–20)
BUN/CREAT SERPL: 10 (ref 12–20)
CALCIUM SERPL-MCNC: 8.4 MG/DL (ref 8.5–10.1)
CALCULATED P AXIS, ECG09: 27 DEGREES
CALCULATED R AXIS, ECG10: 43 DEGREES
CALCULATED T AXIS, ECG11: 3 DEGREES
CHLORIDE SERPL-SCNC: 108 MMOL/L (ref 97–108)
CO2 SERPL-SCNC: 27 MMOL/L (ref 21–32)
COMMENT, HOLDF: NORMAL
CREAT SERPL-MCNC: 0.91 MG/DL (ref 0.7–1.3)
DIAGNOSIS, 93000: NORMAL
DIFFERENTIAL METHOD BLD: NORMAL
EOSINOPHIL # BLD: 0.3 K/UL (ref 0–0.4)
EOSINOPHIL NFR BLD: 6 % (ref 0–7)
ERYTHROCYTE [DISTWIDTH] IN BLOOD BY AUTOMATED COUNT: 13 % (ref 11.5–14.5)
GLOBULIN SER CALC-MCNC: 3.5 G/DL (ref 2–4)
GLUCOSE SERPL-MCNC: 88 MG/DL (ref 65–100)
HCT VFR BLD AUTO: 44.4 % (ref 36.6–50.3)
HGB BLD-MCNC: 14.9 G/DL (ref 12.1–17)
IMM GRANULOCYTES # BLD AUTO: 0 K/UL (ref 0–0.04)
IMM GRANULOCYTES NFR BLD AUTO: 0 % (ref 0–0.5)
LIPASE SERPL-CCNC: 159 U/L (ref 73–393)
LYMPHOCYTES # BLD: 2.2 K/UL (ref 0.8–3.5)
LYMPHOCYTES NFR BLD: 38 % (ref 12–49)
MCH RBC QN AUTO: 29.3 PG (ref 26–34)
MCHC RBC AUTO-ENTMCNC: 33.6 G/DL (ref 30–36.5)
MCV RBC AUTO: 87.2 FL (ref 80–99)
MONOCYTES # BLD: 0.5 K/UL (ref 0–1)
MONOCYTES NFR BLD: 9 % (ref 5–13)
NEUTS SEG # BLD: 2.6 K/UL (ref 1.8–8)
NEUTS SEG NFR BLD: 46 % (ref 32–75)
NRBC # BLD: 0 K/UL (ref 0–0.01)
NRBC BLD-RTO: 0 PER 100 WBC
P-R INTERVAL, ECG05: 142 MS
PLATELET # BLD AUTO: 211 K/UL (ref 150–400)
PMV BLD AUTO: 9.7 FL (ref 8.9–12.9)
POTASSIUM SERPL-SCNC: 3.8 MMOL/L (ref 3.5–5.1)
PROT SERPL-MCNC: 7.1 G/DL (ref 6.4–8.2)
Q-T INTERVAL, ECG07: 372 MS
QRS DURATION, ECG06: 92 MS
QTC CALCULATION (BEZET), ECG08: 452 MS
RBC # BLD AUTO: 5.09 M/UL (ref 4.1–5.7)
SAMPLES BEING HELD,HOLD: NORMAL
SODIUM SERPL-SCNC: 139 MMOL/L (ref 136–145)
TROPONIN I SERPL-MCNC: <0.05 NG/ML
TROPONIN I SERPL-MCNC: <0.05 NG/ML
VENTRICULAR RATE, ECG03: 89 BPM
WBC # BLD AUTO: 5.6 K/UL (ref 4.1–11.1)

## 2019-07-23 PROCEDURE — 80053 COMPREHEN METABOLIC PANEL: CPT

## 2019-07-23 PROCEDURE — 85025 COMPLETE CBC W/AUTO DIFF WBC: CPT

## 2019-07-23 PROCEDURE — 74011636320 HC RX REV CODE- 636/320: Performed by: RADIOLOGY

## 2019-07-23 PROCEDURE — 99285 EMERGENCY DEPT VISIT HI MDM: CPT

## 2019-07-23 PROCEDURE — 83690 ASSAY OF LIPASE: CPT

## 2019-07-23 PROCEDURE — 74011250637 HC RX REV CODE- 250/637: Performed by: EMERGENCY MEDICINE

## 2019-07-23 PROCEDURE — 71275 CT ANGIOGRAPHY CHEST: CPT

## 2019-07-23 PROCEDURE — 36415 COLL VENOUS BLD VENIPUNCTURE: CPT

## 2019-07-23 PROCEDURE — 93005 ELECTROCARDIOGRAM TRACING: CPT

## 2019-07-23 PROCEDURE — 84484 ASSAY OF TROPONIN QUANT: CPT

## 2019-07-23 RX ORDER — NAPROXEN 500 MG/1
500 TABLET ORAL 2 TIMES DAILY WITH MEALS
Qty: 20 TAB | Refills: 0 | Status: SHIPPED | OUTPATIENT
Start: 2019-07-23 | End: 2019-08-02

## 2019-07-23 RX ORDER — ASPIRIN 325 MG
325 TABLET ORAL
Status: COMPLETED | OUTPATIENT
Start: 2019-07-23 | End: 2019-07-23

## 2019-07-23 RX ADMIN — IOPAMIDOL 80 ML: 755 INJECTION, SOLUTION INTRAVENOUS at 11:27

## 2019-07-23 RX ADMIN — ASPIRIN 325 MG: 325 TABLET, COATED ORAL at 11:44

## 2019-07-23 NOTE — ED PROVIDER NOTES
41 y.o. male with past medical history significant for migraines, HTN, sleep apnea, kidney stones, allergic rhinitis, obesity s/p gastric banding, presents ambulatory to the ED with chief complaint of chest pain. Patient reports that onset of chest pain at 0200 this morning when he rolled over in bed and felt a \"sharp pain\" on the left side of his chest, with radiation to his left axilla. Patient describes the pain as 9/10 in severity and notes that it is intermittent and exacerbated with deep inspiration and certain positions. He states that after initial episode of chest pain, he took a dose of Gas-X and went to sleep. Patient reports that when he woke up later this morning, he still had the chest pain and a new numbness in his left arm/elbow. He notes that he had similar chest pain about five years prior but that it only \"lasted a few seconds. \" He still complains of persistent tingling in the left arm, as well as recent shortness of breath with exertion. Patient notes that he has a family history of MI at a young age, but he denies personal history of MI or VTE. He notes use of aspirin when experiencing headaches, but denies daily ASA or other anticoagulation therapy. Patient specifically denies nausea, vomiting, leg pain, or leg swelling. There are no other acute medical concerns at this time. Social Hx: Denies Tobacco use, Positive EtOH use, Positive Illicit Drug use (Romana Lux listed in EMR)  Family Hx: Significant for CAD. PCP: Kathlyne Curling, MD    Note written by Ankita Aponte, as dictated by Ernestina Acosta,  10:30 AM      The history is provided by the patient. No  was used.         Past Medical History:   Diagnosis Date    Allergic rhinitis     Hypertension     Kidney stone     Migraine     Morbid obesity ([facility] [address]     Personal history of kidney stones 2008    Sleep apnea     BiPap       Past Surgical History:   Procedure Laterality Date    ABDOMEN SURGERY PROC UNLISTED  12/12/13    Laparoscopic adjustable gastric banding by Dr Alla Robles         Family History:   Problem Relation Age of Onset    Elevated Lipids Mother     Hypertension Mother     Diabetes Mother     Cancer Mother     Heart Disease Mother     Heart Disease Father     Diabetes Father     Heart Attack Father     Hypertension Father     Cancer Father     Diabetes Sister     Hypertension Sister     Elevated Lipids Sister        Social History     Socioeconomic History    Marital status: SINGLE     Spouse name: Not on file    Number of children: Not on file    Years of education: Not on file    Highest education level: Not on file   Occupational History    Not on file   Social Needs    Financial resource strain: Not on file    Food insecurity:     Worry: Not on file     Inability: Not on file    Transportation needs:     Medical: Not on file     Non-medical: Not on file   Tobacco Use    Smoking status: Never Smoker    Smokeless tobacco: Never Used   Substance and Sexual Activity    Alcohol use: Yes     Alcohol/week: 5.0 standard drinks     Types: 5 Shots of liquor per week     Comment: 5 SHOTS A WEEK MAX.     Drug use: Yes     Types: Marijuana    Sexual activity: Yes   Lifestyle    Physical activity:     Days per week: Not on file     Minutes per session: Not on file    Stress: Not on file   Relationships    Social connections:     Talks on phone: Not on file     Gets together: Not on file     Attends [employer] service: Not on file     Active member of club or organization: Not on file     Attends meetings of clubs or organizations: Not on file     Relationship status: Not on file    Intimate partner violence:     Fear of current or ex partner: Not on file     Emotionally abused: Not on file     Physically abused: Not on file     Forced sexual activity: Not on file   Other Topics Concern    Not on file   Social History Narrative    Not on file         ALLERGIES: Codeine    Review of Systems   Constitutional: Negative for activity change, appetite change, chills and fever. HENT: Negative for congestion, rhinorrhea, sinus pain, sneezing and sore throat. Eyes: Negative for photophobia and visual disturbance. Respiratory: Positive for shortness of breath. Negative for cough. Cardiovascular: Positive for chest pain. Negative for leg swelling. Gastrointestinal: Negative for abdominal pain, blood in stool, constipation, diarrhea, nausea and vomiting. Genitourinary: Negative for difficulty urinating, dysuria, flank pain, hematuria, penile pain and testicular pain. Musculoskeletal: Negative for arthralgias, back pain, myalgias and neck pain. Skin: Negative for rash and wound. Neurological: Positive for numbness. Negative for syncope, weakness, light-headedness and headaches. Psychiatric/Behavioral: Negative for self-injury and suicidal ideas. All other systems reviewed and are negative. Vitals:    07/23/19 1008   BP: (!) 141/99   Pulse: 74   Resp: 16   Temp: 98 °F (36.7 °C)   SpO2: 97%   Weight: 140.2 kg (309 lb)   Height: 6' 4\" (1.93 m)            Physical Exam   Constitutional: He is oriented to person, place, and time. He appears well-developed and well-nourished. No distress. Pleasant   HENT:   Head: Normocephalic and atraumatic. Eyes: Pupils are equal, round, and reactive to light. Conjunctivae and EOM are normal.   Neck: Neck supple. Cardiovascular: Normal rate, regular rhythm and normal heart sounds. Pulmonary/Chest: Effort normal and breath sounds normal. He exhibits no tenderness. Pain is not exacerbated with flexion of the chest wall musculature. Abdominal: Soft. He exhibits no distension. There is no tenderness. Musculoskeletal: He exhibits no edema or tenderness. No lower extremity edema or tenderness. Neurological: He is alert and oriented to person, place, and time. No cranial nerve deficit.    5/5 strength in all four extremities. Slight decrease in sensation in left upper extremity. Otherwise intact sensation. Intact finger-nose. Negative pronator drift. Fluid speech. Cranial nerves grossly intact. Skin: Skin is warm and dry. No rash noted. He is not diaphoretic. Nursing note and vitals reviewed. Note written by Jackie Quigley. Vern Armas, as dictated by Mortimer Pancake, DO 10:30 AM    MDM    41 y.o. male presents with pleuritic chest pain. Concern for ACS vs PE, lower suspicion for dissection given intermittent nature of sx. Labs returned showing no significant abnormalities, including neg troponin x 2. CTA chest neg for acute abnormalities. No PE. Feel that sx are likey associated with pleurisy. Rx'd naprosyn. Rec'd PCP f/u. Return precautions were given for worsening or concerns. Procedures    ED EKG interpretation:  Time 1000  Rhythm: normal sinus rhythm; and regular . Rate (approx.): 89 bpm;  ST/T wave: no ST-segment or T-wave changes. Note written by Jackie Quigley. Vern Armas, as dictated by Mortimer Pancake, DO 10:30 AM     PROGRESS NOTE:  1:09 PM  Patient has been reassessed. Feeling better. Reviewed labs, medications and radiographics with patient. Ready to discharge home. 1:10 PM  Patient's results have been reviewed with them. Patient and/or family have verbally conveyed their understanding and agreement of the patient's signs, symptoms, diagnosis, treatment and prognosis and additionally agree to follow up as recommended or return to the Emergency Room should their condition change prior to follow-up. Discharge instructions have also been provided to the patient with some educational information regarding their diagnosis as well a list of reasons why they would want to return to the ER prior to their follow-up appointment should their condition change.

## 2020-04-13 ENCOUNTER — TELEPHONE (OUTPATIENT)
Dept: SURGERY | Age: 40
End: 2020-04-13

## 2020-12-22 ENCOUNTER — OFFICE VISIT (OUTPATIENT)
Dept: SURGERY | Age: 40
End: 2020-12-22
Payer: COMMERCIAL

## 2020-12-22 VITALS
DIASTOLIC BLOOD PRESSURE: 127 MMHG | TEMPERATURE: 99 F | WEIGHT: 315 LBS | RESPIRATION RATE: 20 BRPM | OXYGEN SATURATION: 98 % | SYSTOLIC BLOOD PRESSURE: 181 MMHG | HEIGHT: 76 IN | HEART RATE: 78 BPM | BODY MASS INDEX: 38.36 KG/M2

## 2020-12-22 DIAGNOSIS — K21.9 GASTROESOPHAGEAL REFLUX DISEASE WITHOUT ESOPHAGITIS: ICD-10-CM

## 2020-12-22 DIAGNOSIS — R10.11 RUQ ABDOMINAL PAIN: Primary | ICD-10-CM

## 2020-12-22 PROCEDURE — 99213 OFFICE O/P EST LOW 20 MIN: CPT | Performed by: SURGERY

## 2020-12-22 RX ORDER — GUAIFENESIN 100 MG/5ML
81 LIQUID (ML) ORAL DAILY
COMMUNITY

## 2020-12-22 RX ORDER — CARVEDILOL 25 MG/1
25 TABLET ORAL 2 TIMES DAILY WITH MEALS
COMMUNITY

## 2020-12-23 NOTE — PROGRESS NOTES
Surgery Consult    Subjective:      Beatrice Harrell is a 45 y.o. male who is being seen for evaluation of abdominal pain. The pain is located in the RUQ with radiation to chest.  Pain is described as sharp and stabbing and measures 7/10 in intensity. Onset of pain was last week. Aggravating factors include fatty foods. Alleviating factors include bland diet. Associated symptoms include nausea, intermittent vomiting, and worsening reflux symptoms. He was evaluated at a local emergency department-CT chest ruled out pulmonary embolism. He was noted to be severely hypertensive and started on a beta-blocker. He denies jaundice, hematemesis, or wheezing. Patient Active Problem List    Diagnosis Date Noted    RUQ abdominal pain 12/22/2020    Vitamin D deficiency 12/01/2014    Hx of laparoscopic gastric banding 01/07/2014    Morbid obesity ([address] 04/05/2013    Hypertension 04/05/2013    Asthma 04/05/2013    Anxiety 04/05/2013    Gastroesophageal reflux disease without esophagitis 04/05/2013     Past Medical History:   Diagnosis Date    Allergic rhinitis     Hypertension     Kidney stone     Migraine     Morbid obesity ([address]     Personal history of kidney stones 2008    Sleep apnea     BiPap      Past Surgical History:   Procedure Laterality Date    ABDOMEN SURGERY PROC UNLISTED  12/12/13    Laparoscopic adjustable gastric banding by Dr Rui Fuller      Social History     Tobacco Use    Smoking status: Never Smoker    Smokeless tobacco: Never Used   Substance Use Topics    Alcohol use: Yes     Alcohol/week: 5.0 standard drinks     Types: 5 Shots of liquor per week     Comment: 5 SHOTS A WEEK MAX.       Family History   Problem Relation Age of Onset    Elevated Lipids Mother     Hypertension Mother     Diabetes Mother     Cancer Mother     Heart Disease Mother     Heart Disease Father     Diabetes Father     Heart Attack Father     Hypertension Father     Cancer Father     Diabetes Sister     Hypertension Sister     Elevated Lipids Sister       Current Outpatient Medications   Medication Sig    carvediloL (COREG) 25 mg tablet Take 25 mg by mouth two (2) times daily (with meals).  aspirin 81 mg chewable tablet Take 81 mg by mouth daily. No current facility-administered medications for this visit. Allergies   Allergen Reactions    Codeine Palpitations       Review of Systems:    A complete review of systems was negative except as noted in the HPI. Objective:        Visit Vitals  BP (!) 181/127   Pulse 78   Temp 99 °F (37.2 °C)   Resp 20   Ht 6' 4\" (1.93 m)   Wt (!) 374 lb (169.6 kg)   SpO2 98%   BMI 45.52 kg/m²       Physical Exam:  GENERAL: alert, cooperative, no distress, appears stated age, morbidly obese, EYE: negative, LYMPH NODES: No cervical or supraclavicular adenopathy. THROAT & NECK: normal, LUNG: clear to auscultation bilaterally, HEART: regular rate and rhythm, S1, S2 normal, no murmur. ABDOMEN: Obese, NABS, soft. Mild right subcostal pain with palpation. No mass, guarding, or hernia. EXTREMITIES:  extremities normal, atraumatic, no cyanosis or edema, SKIN: Normal., NEUROLOGIC: negative    Imaging:  reviewed  CT angiogram of chest (no pulmonary embolism, mild distal esophageal thickening)      Assessment:     Abdominal pain, suspect biliary colic. He also has worsening reflux symptoms with distal esophageal thickening on recent imaging. Plan:     1. I recommend proceeding with upper abdominal ultrasound, upper gastrointestinal series to assess for gallstones, hiatal hernia/spontaneous reflux, respectively. 2.  Low-fat diet as tolerated. Antacids as needed. 3.  Follow-up after above.

## 2020-12-23 NOTE — PATIENT INSTRUCTIONS
Gastroesophageal Reflux Disease (GERD): Care Instructions Overview Gastroesophageal reflux disease (GERD) is the backward flow of stomach acid into the esophagus. The esophagus is the tube that leads from your throat to your stomach. A one-way valve prevents the stomach acid from backing up into this tube. But when you have GERD, this valve does not close tightly enough. This can also cause pain and swelling in your esophagus. (This is called esophagitis.) If you have mild GERD symptoms including heartburn, you may be able to control the problem with antacids or over-the-counter medicine. You can also make lifestyle changes to help reduce your symptoms. These include changing your diet and eating habits, such as not eating late at night and losing weight. Follow-up care is a key part of your treatment and safety. Be sure to make and go to all appointments, and call your doctor if you are having problems. It's also a good idea to know your test results and keep a list of the medicines you take. How can you care for yourself at home? · Take your medicines exactly as prescribed. Call your doctor if you think you are having a problem with your medicine. · Your doctor may recommend over-the-counter medicine. For mild or occasional indigestion, antacids, such as Tums, Gaviscon, Mylanta, or Maalox, may help. Your doctor also may recommend over-the-counter acid reducers, such as famotidine (Pepcid AC), cimetidine (Tagamet HB), or omeprazole (Prilosec). Read and follow all instructions on the label. If you use these medicines often, talk with your doctor. · Change your eating habits. ? It's best to eat several small meals instead of two or three large meals. ? After you eat, wait 2 to 3 hours before you lie down. ? Chocolate, mint, and alcohol can make GERD worse. ? Spicy foods, foods that have a lot of acid (like tomatoes and oranges), and coffee can make GERD symptoms worse in some people. If your symptoms are worse after you eat a certain food, you may want to stop eating that food to see if your symptoms get better. · Do not smoke or chew tobacco. Smoking can make GERD worse. If you need help quitting, talk to your doctor about stop-smoking programs and medicines. These can increase your chances of quitting for good. · If you have GERD symptoms at night, raise the head of your bed 6 to 8 inches by putting the frame on blocks or placing a foam wedge under the head of your mattress. (Adding extra pillows does not work.) · Do not wear tight clothing around your middle. · Lose weight if you need to. Losing just 5 to 10 pounds can help. When should you call for help? Call your doctor now or seek immediate medical care if: 
  · You have new or different belly pain.  
  · Your stools are black and tarlike or have streaks of blood. Watch closely for changes in your health, and be sure to contact your doctor if: 
  · Your symptoms have not improved after 2 days.  
  · Food seems to catch in your throat or chest.  
Where can you learn more? Go to http://www.gray.com/ Enter J457 in the search box to learn more about \"Gastroesophageal Reflux Disease (GERD): Care Instructions. \" Current as of: April 15, 2020               Content Version: 12.6 © 8404-5080 [employer]. Care instructions adapted under license by [employer] (which disclaims liability or warranty for this information). If you have questions about a medical condition or this instruction, always ask your healthcare professional. [place] [address] any warranty or liability for your use of this information.

## 2020-12-30 ENCOUNTER — HOSPITAL ENCOUNTER (OUTPATIENT)
Dept: GENERAL RADIOLOGY | Age: 40
Discharge: HOME | End: 2020-12-30
Attending: SURGERY
Payer: COMMERCIAL

## 2020-12-30 ENCOUNTER — HOSPITAL ENCOUNTER (OUTPATIENT)
Dept: ULTRASOUND IMAGING | Age: 40
Discharge: HOME | End: 2020-12-30
Attending: SURGERY
Payer: COMMERCIAL

## 2020-12-30 DIAGNOSIS — K21.9 GASTROESOPHAGEAL REFLUX DISEASE WITHOUT ESOPHAGITIS: ICD-10-CM

## 2020-12-30 DIAGNOSIS — R10.11 RUQ ABDOMINAL PAIN: ICD-10-CM

## 2020-12-30 PROCEDURE — 76705 ECHO EXAM OF ABDOMEN: CPT

## 2020-12-30 PROCEDURE — 74240 X-RAY XM UPR GI TRC 1CNTRST: CPT

## 2021-01-07 ENCOUNTER — TELEPHONE (OUTPATIENT)
Dept: SURGERY | Age: 41
End: 2021-01-07

## 2021-01-07 DIAGNOSIS — R63.5 WEIGHT GAIN, ABNORMAL: Primary | ICD-10-CM

## 2021-01-07 NOTE — TELEPHONE ENCOUNTER
I called the patient back and I let him know that Dr Glenn Garrison was in surgery this morning and has clinic this afternoon but I will forward this message to him so he can call him back with results. Pt in agreement.

## 2021-01-07 NOTE — TELEPHONE ENCOUNTER
Reviewed ultrasound and upper GI findings with patient. Ultrasound reveals fatty liver but no other abnormality. Upper GI reveals well-positioned band with mild spontaneous reflux but no slip or hiatal hernia. He desires to pursue band adjustment. He will call to schedule an appointment in the office. I will place dietitian evaluation to it cyst and behavior management. Patient in agreement.

## 2021-10-26 ENCOUNTER — TELEPHONE (OUTPATIENT)
Dept: SURGERY | Age: 41
End: 2021-10-26

## 2022-10-20 ENCOUNTER — TELEPHONE (OUTPATIENT)
Dept: SURGERY | Age: 42
End: 2022-10-20

## (undated) DEVICE — SYR 10ML LUER LOK 1/5ML GRAD --

## (undated) DEVICE — NDL PRT INJ NSAF BLNT 18GX1.5 --

## (undated) DEVICE — Device

## (undated) DEVICE — GAUZE SPONGES,8 PLY: Brand: CURITY

## (undated) DEVICE — STERILE POLYISOPRENE POWDER-FREE SURGICAL GLOVES: Brand: PROTEXIS

## (undated) DEVICE — STOPCOCK IV 3W --

## (undated) DEVICE — BNDG ADHESIVE SHEER 3/4X3 -- CONVERT TO ITEM 357948